# Patient Record
Sex: MALE | Race: WHITE | URBAN - METROPOLITAN AREA
[De-identification: names, ages, dates, MRNs, and addresses within clinical notes are randomized per-mention and may not be internally consistent; named-entity substitution may affect disease eponyms.]

---

## 2023-02-02 ENCOUNTER — APPOINTMENT (OUTPATIENT)
Dept: GENERAL RADIOLOGY | Age: 78
End: 2023-02-02
Payer: MEDICARE

## 2023-02-02 ENCOUNTER — HOSPITAL ENCOUNTER (OUTPATIENT)
Age: 78
Setting detail: OBSERVATION
LOS: 2 days | Discharge: HOME | End: 2023-02-04
Attending: STUDENT IN AN ORGANIZED HEALTH CARE EDUCATION/TRAINING PROGRAM | Admitting: HOSPITALIST
Payer: MEDICARE

## 2023-02-02 DIAGNOSIS — N17.9 AKI (ACUTE KIDNEY INJURY) (HCC): ICD-10-CM

## 2023-02-02 DIAGNOSIS — E87.5 HYPERKALEMIA: Primary | ICD-10-CM

## 2023-02-02 LAB
ABSOLUTE EOS #: 0.2 K/UL (ref 0–0.4)
ABSOLUTE LYMPH #: 0.5 K/UL (ref 1–4.8)
ABSOLUTE MONO #: 0.5 K/UL (ref 0.1–1.2)
ALBUMIN SERPL-MCNC: 4.3 G/DL (ref 3.5–5.2)
ALBUMIN/GLOBULIN RATIO: 1.1 (ref 1–2.5)
ALP SERPL-CCNC: 53 U/L (ref 40–129)
ALT SERPL-CCNC: 11 U/L (ref 5–41)
ANION GAP SERPL CALCULATED.3IONS-SCNC: 11 MMOL/L (ref 9–17)
ANION GAP SERPL CALCULATED.3IONS-SCNC: 9 MMOL/L (ref 9–17)
AST SERPL-CCNC: 18 U/L
BASOPHILS # BLD: 1 % (ref 0–2)
BASOPHILS ABSOLUTE: 0 K/UL (ref 0–0.2)
BILIRUB SERPL-MCNC: 0.2 MG/DL (ref 0.3–1.2)
BUN SERPL-MCNC: 38 MG/DL (ref 8–23)
BUN SERPL-MCNC: 39 MG/DL (ref 8–23)
CALCIUM SERPL-MCNC: 8.4 MG/DL (ref 8.6–10.4)
CALCIUM SERPL-MCNC: 9.1 MG/DL (ref 8.6–10.4)
CHLORIDE SERPL-SCNC: 105 MMOL/L (ref 98–107)
CHLORIDE SERPL-SCNC: 109 MMOL/L (ref 98–107)
CO2 SERPL-SCNC: 21 MMOL/L (ref 20–31)
CO2 SERPL-SCNC: 21 MMOL/L (ref 20–31)
CREAT SERPL-MCNC: 1.73 MG/DL (ref 0.7–1.2)
CREAT SERPL-MCNC: 1.79 MG/DL (ref 0.7–1.2)
EOSINOPHILS RELATIVE PERCENT: 4 % (ref 1–4)
GFR SERPL CREATININE-BSD FRML MDRD: 39 ML/MIN/1.73M2
GFR SERPL CREATININE-BSD FRML MDRD: 40 ML/MIN/1.73M2
GLUCOSE BLD-MCNC: 108 MG/DL (ref 75–110)
GLUCOSE SERPL-MCNC: 106 MG/DL (ref 70–99)
GLUCOSE SERPL-MCNC: 157 MG/DL (ref 70–99)
HCT VFR BLD AUTO: 37.6 % (ref 41–53)
HGB BLD-MCNC: 12.4 G/DL (ref 13.5–17.5)
LYMPHOCYTES # BLD: 10 % (ref 24–44)
MCH RBC QN AUTO: 29.3 PG (ref 26–34)
MCHC RBC AUTO-ENTMCNC: 33.1 G/DL (ref 31–37)
MCV RBC AUTO: 88.7 FL (ref 80–100)
MONOCYTES # BLD: 10 % (ref 2–11)
PDW BLD-RTO: 14.3 % (ref 12.5–15.4)
PLATELET # BLD AUTO: 228 K/UL (ref 140–450)
PMV BLD AUTO: 7.8 FL (ref 6–12)
POTASSIUM SERPL-SCNC: 5.7 MMOL/L (ref 3.7–5.3)
POTASSIUM SERPL-SCNC: 6.4 MMOL/L (ref 3.7–5.3)
PROT SERPL-MCNC: 8.3 G/DL (ref 6.4–8.3)
RBC # BLD: 4.24 M/UL (ref 4.5–5.9)
SEG NEUTROPHILS: 75 % (ref 36–66)
SEGMENTED NEUTROPHILS ABSOLUTE COUNT: 4.1 K/UL (ref 1.8–7.7)
SODIUM SERPL-SCNC: 137 MMOL/L (ref 135–144)
SODIUM SERPL-SCNC: 139 MMOL/L (ref 135–144)
TROPONIN I SERPL DL<=0.01 NG/ML-MCNC: 38 NG/L (ref 0–22)
TROPONIN I SERPL DL<=0.01 NG/ML-MCNC: 45 NG/L (ref 0–22)
WBC # BLD AUTO: 5.4 K/UL (ref 3.5–11)

## 2023-02-02 PROCEDURE — 96375 TX/PRO/DX INJ NEW DRUG ADDON: CPT

## 2023-02-02 PROCEDURE — 2500000003 HC RX 250 WO HCPCS: Performed by: NURSE PRACTITIONER

## 2023-02-02 PROCEDURE — 80053 COMPREHEN METABOLIC PANEL: CPT

## 2023-02-02 PROCEDURE — 36415 COLL VENOUS BLD VENIPUNCTURE: CPT

## 2023-02-02 PROCEDURE — 82947 ASSAY GLUCOSE BLOOD QUANT: CPT

## 2023-02-02 PROCEDURE — 84484 ASSAY OF TROPONIN QUANT: CPT

## 2023-02-02 PROCEDURE — 2580000003 HC RX 258: Performed by: CLINICAL NURSE SPECIALIST

## 2023-02-02 PROCEDURE — 2580000003 HC RX 258: Performed by: NURSE PRACTITIONER

## 2023-02-02 PROCEDURE — 99285 EMERGENCY DEPT VISIT HI MDM: CPT

## 2023-02-02 PROCEDURE — 6360000002 HC RX W HCPCS: Performed by: NURSE PRACTITIONER

## 2023-02-02 PROCEDURE — 93005 ELECTROCARDIOGRAM TRACING: CPT | Performed by: NURSE PRACTITIONER

## 2023-02-02 PROCEDURE — 71045 X-RAY EXAM CHEST 1 VIEW: CPT

## 2023-02-02 PROCEDURE — 6370000000 HC RX 637 (ALT 250 FOR IP): Performed by: NURSE PRACTITIONER

## 2023-02-02 PROCEDURE — 2060000000 HC ICU INTERMEDIATE R&B

## 2023-02-02 PROCEDURE — 80048 BASIC METABOLIC PNL TOTAL CA: CPT

## 2023-02-02 PROCEDURE — 96374 THER/PROPH/DIAG INJ IV PUSH: CPT

## 2023-02-02 PROCEDURE — 96361 HYDRATE IV INFUSION ADD-ON: CPT

## 2023-02-02 PROCEDURE — 85025 COMPLETE CBC W/AUTO DIFF WBC: CPT

## 2023-02-02 RX ORDER — CARVEDILOL 25 MG/1
25 TABLET ORAL 2 TIMES DAILY WITH MEALS
COMMUNITY

## 2023-02-02 RX ORDER — SODIUM CHLORIDE 9 MG/ML
INJECTION, SOLUTION INTRAVENOUS CONTINUOUS
Status: DISCONTINUED | OUTPATIENT
Start: 2023-02-02 | End: 2023-02-03

## 2023-02-02 RX ORDER — TAMSULOSIN HYDROCHLORIDE 0.4 MG/1
0.4 CAPSULE ORAL
COMMUNITY

## 2023-02-02 RX ORDER — OXYBUTYNIN CHLORIDE 5 MG/1
5 TABLET ORAL 2 TIMES DAILY
Status: DISCONTINUED | OUTPATIENT
Start: 2023-02-02 | End: 2023-02-04 | Stop reason: HOSPADM

## 2023-02-02 RX ORDER — HEPARIN SODIUM 5000 [USP'U]/ML
5000 INJECTION, SOLUTION INTRAVENOUS; SUBCUTANEOUS EVERY 8 HOURS SCHEDULED
Status: DISCONTINUED | OUTPATIENT
Start: 2023-02-02 | End: 2023-02-04 | Stop reason: HOSPADM

## 2023-02-02 RX ORDER — SODIUM CHLORIDE 9 MG/ML
INJECTION, SOLUTION INTRAVENOUS PRN
Status: DISCONTINUED | OUTPATIENT
Start: 2023-02-02 | End: 2023-02-04 | Stop reason: HOSPADM

## 2023-02-02 RX ORDER — ASPIRIN 81 MG/1
81 TABLET, CHEWABLE ORAL
COMMUNITY

## 2023-02-02 RX ORDER — ALOGLIPTIN 12.5 MG/1
12.5 TABLET, FILM COATED ORAL DAILY
Status: DISCONTINUED | OUTPATIENT
Start: 2023-02-03 | End: 2023-02-04 | Stop reason: HOSPADM

## 2023-02-02 RX ORDER — SODIUM CHLORIDE 0.9 % (FLUSH) 0.9 %
5-40 SYRINGE (ML) INJECTION PRN
Status: DISCONTINUED | OUTPATIENT
Start: 2023-02-02 | End: 2023-02-04 | Stop reason: HOSPADM

## 2023-02-02 RX ORDER — DEXTROSE MONOHYDRATE 25 G/50ML
25 INJECTION, SOLUTION INTRAVENOUS ONCE
Status: COMPLETED | OUTPATIENT
Start: 2023-02-02 | End: 2023-02-02

## 2023-02-02 RX ORDER — ACETAMINOPHEN 325 MG/1
650 TABLET ORAL EVERY 6 HOURS PRN
Status: DISCONTINUED | OUTPATIENT
Start: 2023-02-02 | End: 2023-02-04 | Stop reason: HOSPADM

## 2023-02-02 RX ORDER — ALOGLIPTIN 12.5 MG/1
12.5 TABLET, FILM COATED ORAL DAILY
COMMUNITY

## 2023-02-02 RX ORDER — ASPIRIN 81 MG/1
81 TABLET, CHEWABLE ORAL DAILY
Status: DISCONTINUED | OUTPATIENT
Start: 2023-02-03 | End: 2023-02-04 | Stop reason: HOSPADM

## 2023-02-02 RX ORDER — OXYBUTYNIN CHLORIDE 5 MG/1
5 TABLET ORAL 2 TIMES DAILY
COMMUNITY

## 2023-02-02 RX ORDER — SODIUM CHLORIDE 0.9 % (FLUSH) 0.9 %
5-40 SYRINGE (ML) INJECTION EVERY 12 HOURS SCHEDULED
Status: DISCONTINUED | OUTPATIENT
Start: 2023-02-02 | End: 2023-02-04 | Stop reason: HOSPADM

## 2023-02-02 RX ORDER — ONDANSETRON 4 MG/1
4 TABLET, ORALLY DISINTEGRATING ORAL EVERY 8 HOURS PRN
Status: DISCONTINUED | OUTPATIENT
Start: 2023-02-02 | End: 2023-02-04 | Stop reason: HOSPADM

## 2023-02-02 RX ORDER — FUROSEMIDE 10 MG/ML
20 INJECTION INTRAMUSCULAR; INTRAVENOUS ONCE
Status: COMPLETED | OUTPATIENT
Start: 2023-02-02 | End: 2023-02-02

## 2023-02-02 RX ORDER — NIFEDIPINE 30 MG/1
90 TABLET, EXTENDED RELEASE ORAL DAILY
Status: DISCONTINUED | OUTPATIENT
Start: 2023-02-03 | End: 2023-02-04 | Stop reason: HOSPADM

## 2023-02-02 RX ORDER — CARVEDILOL 12.5 MG/1
25 TABLET ORAL 2 TIMES DAILY WITH MEALS
Status: DISCONTINUED | OUTPATIENT
Start: 2023-02-03 | End: 2023-02-04 | Stop reason: HOSPADM

## 2023-02-02 RX ORDER — ONDANSETRON 2 MG/ML
4 INJECTION INTRAMUSCULAR; INTRAVENOUS EVERY 6 HOURS PRN
Status: DISCONTINUED | OUTPATIENT
Start: 2023-02-02 | End: 2023-02-04 | Stop reason: HOSPADM

## 2023-02-02 RX ORDER — NIFEDIPINE 90 MG/1
90 TABLET, FILM COATED, EXTENDED RELEASE ORAL DAILY
COMMUNITY

## 2023-02-02 RX ORDER — TAMSULOSIN HYDROCHLORIDE 0.4 MG/1
0.4 CAPSULE ORAL DAILY
Status: DISCONTINUED | OUTPATIENT
Start: 2023-02-03 | End: 2023-02-04 | Stop reason: HOSPADM

## 2023-02-02 RX ORDER — FINASTERIDE 5 MG/1
5 TABLET, FILM COATED ORAL
COMMUNITY

## 2023-02-02 RX ORDER — 0.9 % SODIUM CHLORIDE 0.9 %
1000 INTRAVENOUS SOLUTION INTRAVENOUS ONCE
Status: COMPLETED | OUTPATIENT
Start: 2023-02-02 | End: 2023-02-02

## 2023-02-02 RX ADMIN — FUROSEMIDE 20 MG: 10 INJECTION, SOLUTION INTRAMUSCULAR; INTRAVENOUS at 19:22

## 2023-02-02 RX ADMIN — SODIUM CHLORIDE: 9 INJECTION, SOLUTION INTRAVENOUS at 21:50

## 2023-02-02 RX ADMIN — SODIUM CHLORIDE 1000 ML: 9 INJECTION, SOLUTION INTRAVENOUS at 19:19

## 2023-02-02 RX ADMIN — DEXTROSE MONOHYDRATE 25 G: 25 INJECTION, SOLUTION INTRAVENOUS at 19:19

## 2023-02-02 RX ADMIN — INSULIN HUMAN 5 UNITS: 100 INJECTION, SOLUTION PARENTERAL at 19:23

## 2023-02-02 ASSESSMENT — ENCOUNTER SYMPTOMS
EYES NEGATIVE: 1
GASTROINTESTINAL NEGATIVE: 1
RESPIRATORY NEGATIVE: 1

## 2023-02-02 ASSESSMENT — PAIN - FUNCTIONAL ASSESSMENT: PAIN_FUNCTIONAL_ASSESSMENT: NONE - DENIES PAIN

## 2023-02-02 ASSESSMENT — LIFESTYLE VARIABLES: HOW OFTEN DO YOU HAVE A DRINK CONTAINING ALCOHOL: 2-3 TIMES A WEEK

## 2023-02-02 NOTE — ED PROVIDER NOTES
[address] [facility]  [address] [address] [address]  Phone: [phone]  Fax: [phone]      Attending Physician Attestation    I performed a history and physical examination of the patient and discussed management with the mid level provider. I reviewed the mid level provider's note and agree with the documented findings and plan of care. Any areas of disagreement are noted on the chart. I was personally present for the key portions of any procedures. I have documented in the chart those procedures where I was not present during the key portions. I have reviewed the emergency nurses triage note. I agree with the chief complaint, past medical history, past surgical history, allergies, medications, social and family history as documented unless otherwise noted below. Documentation of the HPI, Physical Exam and Medical Decision Making performed by mid level providers is based on my personal performance of the HPI, PE and MDM. For Physician Assistant/ Nurse Practitioner cases/documentation I have personally evaluated this patient and have completed at least one if not all key elements of the E/M (history, physical exam, and MDM). Additional findings are as noted. INITIAL VITALS:  height is 5' 11\" (1.803 m) and weight is 93.9 kg (207 lb). His oral temperature is 98.2 °F (36.8 °C). His blood pressure is 139/57 (abnormal) and his pulse is 68. His respiration is 16 and oxygen saturation is 94%. DIAGNOSTIC RESULTS       RADIOLOGY:   Non-plain film images such as CT, Ultrasound and MRI are read by the radiologist. Plain radiographic images are visualized and the radiologist interpretations are reviewed as follows:      No orders to display         LABS:  Results for orders placed or performed during the hospital encounter of 02/02/23   CBC with Auto Differential   Result Value Ref Range    WBC 5.4 3.5 - 11.0 k/uL    RBC 4.24 (L) 4.5 - 5.9 m/uL    Hemoglobin 12.4 (L) 13.5 - 17.5 g/dL    Hematocrit 37.6 (L) 41 - 53 %    MCV 88.7 80 - 100 fL    MCH 29.3 26 - 34 pg    MCHC 33.1 31 - 37 g/dL    RDW 14.3 12.5 - 15.4 %    Platelets 383 351 - 864 k/uL    MPV 7.8 6.0 - 12.0 fL    Seg Neutrophils 75 (H) 36 - 66 %    Lymphocytes 10 (L) 24 - 44 %    Monocytes 10 2 - 11 %    Eosinophils % 4 1 - 4 %    Basophils 1 0 - 2 %    Segs Absolute 4.10 1.8 - 7.7 k/uL    Absolute Lymph # 0.50 (L) 1.0 - 4.8 k/uL    Absolute Mono # 0.50 0.1 - 1.2 k/uL    Absolute Eos # 0.20 0.0 - 0.4 k/uL    Basophils Absolute 0.00 0.0 - 0.2 k/uL       EKG: Interpreted by me. Normal sinus rhythm, rate 71, normal axis, mild ST depression in lead I and aVL, no prior EKG available for comparison    EMERGENCY DEPARTMENT COURSE:   Vitals:    Vitals:    02/02/23 1800 02/02/23 1820   BP: (!) 133/49 (!) 139/57   Pulse: 75 68   Resp: 18 16   Temp: 98.2 °F (36.8 °C)    TempSrc: Oral    SpO2: 95% 94%   Weight: 93.9 kg (207 lb)    Height: 5' 11\" (1.803 m)      -------------------------  BP: (!) 139/57, Temp: 98.2 °F (36.8 °C), Heart Rate: 68, Resp: 16      PERTINENT ATTENDING PHYSICIAN COMMENTS:    Outpatient labs done and showed hyperkalemia of 6.3 (as far as he can remember). He has been dealing with hyperkalemia for the last several months. He recently was taken off his lisinopril and had a renal ultrasound done but does not have results of this test.  He denies any symptoms at this time. On exam, patient wake alert and in no acute distress. Vital signs are stable. Abdomen soft, nontender, nondistended. Heart regular rate and rhythm without murmur, lungs clear to auscultation bilaterally. Mild bilateral lower extremity edema. Bilateral radial pulses 2+. Plan for basic labs, EKG. EKG without significant findings. Awaiting labs.       (Please note that portions of this note were completed with a voice recognition program.  Efforts were made to edit the dictations but occasionally words are mis-transcribed.)    Kerry Lindsay,   Emergency Medicine Physician      Kerry Lindsay, DO  02/02/23 [address] [facility]5 Kaiser Hayward, DO  02/02/23 1853

## 2023-02-02 NOTE — ED TRIAGE NOTES
[facility] K+ 6.3 this am.  Pt states had high K+ a couple months ago. Pt denies any kidney issues. EKG done upon rooming. Pt is A/O, non distressful, denies CP or any SOB. Pt is accompanied by son.

## 2023-02-02 NOTE — ED PROVIDER NOTES
[address] [facility]  [address] [facility] [address] [facility] [address]  Phone: [phone]  Fax: [phone]        Pt Name: Alix Griffith  MRN: [id number]  [place] 1945  Date of evaluation: 2/2/23    [address] [place] [address]       Chief Complaint   Patient presents with    Abnormal Lab       HISTORY OF PRESENT ILLNESS (Location/Symptom, Timing/Onset, Context/Setting, Quality, Duration, Modifying Factors, Severity)      Alix Griffith is a 68 y.o. male with no pertinent PMH who presents to the ED via private auto with complaints of an abnormal lab. [facility], he has been dealing with hyperkalemia for the last few months now. He states that he had some blood work done earlier today at the [place], they called him at [facility] and told him that he had an elevated potassium of 6.8 and to go to the emergency department. He denies any symptoms at this time. He denies any chest pain shortness of breath or palpitations. He denies any change in his urinary or bowel habits. Denies any decrease in urinary frequency. He states he has not noticed any blood in his urine or stool. No symptoms at this time    PAST MEDICAL / SURGICAL / SOCIAL / FAMILY HISTORY     PMH:  has a past medical history of CAD (coronary artery disease), Diabetes mellitus ([address] Heart attack ([address] Hypertension, and Leaky heart valve. Surgical History:  has a past surgical history that includes joint replacement (Bilateral); Appendectomy; and eye surgery (Bilateral). Social History:  reports that he has been smoking cigars. He has never used smokeless tobacco. He reports current alcohol use. He reports that he does not use drugs. Family History: has no family status information on file. family history is not on file. Psychiatric History: None    Allergies: Patient has no known allergies. Home Medications:   Prior to Admission medications    Medication Sig Start Date End Date Taking? Authorizing Provider   alogliptin (NESINA) 12.5 MG TABS tablet Take 12.5 mg by mouth daily   Yes Historical Provider, MD   NIFEdipine (ADALAT CC) 90 MG extended release tablet Take 90 mg by mouth daily   Yes Historical Provider, MD   carvedilol (COREG) 25 MG tablet Take 25 mg by mouth 2 times daily (with meals)   Yes Historical Provider, MD   aspirin 81 MG chewable tablet Take 81 mg by mouth    Historical Provider, MD   finasteride (PROSCAR) 5 MG tablet Take 5 mg by mouth    Historical Provider, MD   metFORMIN (GLUCOPHAGE) 1000 MG tablet Take 500 mg by mouth 2 times daily    Historical Provider, MD   oxybutynin (DITROPAN) 5 MG tablet Take 5 mg by mouth 2 times daily    Historical Provider, MD   tamsulosin (FLOMAX) 0.4 MG capsule Take 0.4 mg by mouth    Historical Provider, MD   sertraline (ZOLOFT) 50 MG tablet Take 50 mg by mouth    Historical Provider, MD       REVIEW OF SYSTEMS  (2-9 systems for level 4, 10 ormore for level 5)      Review of Systems   Constitutional: Negative. HENT: Negative. Eyes: Negative. Respiratory: Negative. Cardiovascular: Negative. Gastrointestinal: Negative. Endocrine: Negative. Genitourinary: Negative. Musculoskeletal: Negative. Skin: Negative. Neurological: Negative. Hematological: Negative. Psychiatric/Behavioral: Negative. All other systems negative except as marked. PHYSICAL EXAM  (up to 7 for level 4, 8 or more for level 5)      INITIAL VITALS:  height is 5' 11\" (1.803 m) and weight is 93.9 kg (207 lb). His oral temperature is 98.2 °F (36.8 °C). His blood pressure is 147/54 (abnormal) and his pulse is 70. His respiration is 19 and oxygen saturation is 92%. Vital signs reviewed. Physical Exam  Constitutional:       Appearance: Normal appearance. HENT:      Head: Normocephalic.       Right Ear: External ear normal.      Left Ear: External ear normal.      Nose: Nose normal.      Mouth/Throat:      Mouth: Mucous membranes are moist.      Pharynx: Oropharynx is clear. Eyes:      Extraocular Movements: Extraocular movements intact. Conjunctiva/sclera: Conjunctivae normal.      Pupils: Pupils are equal, round, and reactive to light. Cardiovascular:      Rate and Rhythm: Normal rate and regular rhythm. Pulses: Normal pulses. Heart sounds: Murmur heard. Pulmonary:      Effort: Pulmonary effort is normal.      Breath sounds: Normal breath sounds. Abdominal:      General: Bowel sounds are normal. There is no distension. Palpations: Abdomen is soft. Tenderness: There is no abdominal tenderness. There is no guarding. Musculoskeletal:         General: Normal range of motion. Cervical back: Normal range of motion. Skin:     General: Skin is warm and dry. Capillary Refill: Capillary refill takes less than 2 seconds. Neurological:      Mental Status: He is alert and oriented to person, place, and time. Psychiatric:         Mood and Affect: Mood normal.         Behavior: Behavior normal.         Thought Content: Thought content normal.         Judgment: Judgment normal.         DIFFERENTIAL DIAGNOSIS / MDM     On exam, patient is resting in his room. His son is at bedside. He appears nontoxic no distress is noted. Grade II murmur noted over the 2nd ICS, left side. Lung sounds are clear and equal bilaterally. Bowel sounds are present in all 4 quadrants. No abdominal distention tenderness or guarding is noted. Order some repeat lab work including CBC CMP troponin EKG and reassess. Troponin of 45 potassium of 6.4, creatinine of 1.79 BUN is elevated at 39, CBC showed hemoglobin of 12.4, white blood cell count of 5.4 glucose of 157. Chest x-ray showing no acute process per radiologist read. Insulin dextrose Lasix and fluids are ordered. Blood sugar 108. Patient signed out to the hospitalist team for admission at . Awaiting a callback as of 2038. Repeat troponin is improved at 38.   I spoke with Ml Gibson with the hospitalist team who has agreed to bring the patient in. She is requesting that repeat the potassium while patient is down here, order has been placed. Patient resting comfortably in his room currently awaiting bed assignment. PLAN (LABS / IMAGING / EKG):  Orders Placed This Encounter   Procedures    XR CHEST PORTABLE    CBC with Auto Differential    Comprehensive Metabolic Panel w/ Reflex to MG    Troponin    Troponin    Basic Metabolic Panel    Non-Invasive Hemodynamic Monitoring    POC Glucose Fingerstick    EKG 12 Lead    EKG 12 Lead    ADMIT TO INPATIENT       MEDICATIONS ORDERED:  Orders Placed This Encounter   Medications    furosemide (LASIX) injection 20 mg    insulin regular (HUMULIN R;NOVOLIN R) injection 5 Units    0.9 % sodium chloride bolus    dextrose 50 % IV solution         Controlled Substances Monitoring:     DIAGNOSTIC RESULTS     EKG: All EKG's are interpreted by the Emergency Department Physician who either signs or Co-signs this chart in the absenceof a cardiologist.      RADIOLOGY: All images are read by the radiologist and their interpretations are reviewed. XR CHEST PORTABLE   Final Result   No acute process. Bibasilar hypoaeration             No results found.     LABS:  Results for orders placed or performed during the hospital encounter of 02/02/23   CBC with Auto Differential   Result Value Ref Range    WBC 5.4 3.5 - 11.0 k/uL    RBC 4.24 (L) 4.5 - 5.9 m/uL    Hemoglobin 12.4 (L) 13.5 - 17.5 g/dL    Hematocrit 37.6 (L) 41 - 53 %    MCV 88.7 80 - 100 fL    MCH 29.3 26 - 34 pg    MCHC 33.1 31 - 37 g/dL    RDW 14.3 12.5 - 15.4 %    Platelets 617 187 - 521 k/uL    MPV 7.8 6.0 - 12.0 fL    Seg Neutrophils 75 (H) 36 - 66 %    Lymphocytes 10 (L) 24 - 44 %    Monocytes 10 2 - 11 %    Eosinophils % 4 1 - 4 %    Basophils 1 0 - 2 %    Segs Absolute 4.10 1.8 - 7.7 k/uL    Absolute Lymph # 0.50 (L) 1.0 - 4.8 k/uL    Absolute Mono # 0.50 0.1 - 1.2 k/uL Absolute Eos # 0.20 0.0 - 0.4 k/uL    Basophils Absolute 0.00 0.0 - 0.2 k/uL   Comprehensive Metabolic Panel w/ Reflex to MG   Result Value Ref Range    Glucose 157 (H) 70 - 99 mg/dL    BUN 39 (H) 8 - 23 mg/dL    Creatinine 1.79 (H) 0.70 - 1.20 mg/dL    Est, Glom Filt Rate 39 (L) >60 mL/min/1.73m2    Calcium 9.1 8.6 - 10.4 mg/dL    Sodium 137 135 - 144 mmol/L    Potassium 6.4 (HH) 3.7 - 5.3 mmol/L    Chloride 105 98 - 107 mmol/L    CO2 21 20 - 31 mmol/L    Anion Gap 11 9 - 17 mmol/L    Alkaline Phosphatase 53 40 - 129 U/L    ALT 11 5 - 41 U/L    AST 18 <40 U/L    Total Bilirubin 0.2 (L) 0.3 - 1.2 mg/dL    Total Protein 8.3 6.4 - 8.3 g/dL    Albumin 4.3 3.5 - 5.2 g/dL    Albumin/Globulin Ratio 1.1 1.0 - 2.5   Troponin   Result Value Ref Range    Troponin, High Sensitivity 45 (H) 0 - 22 ng/L   Troponin   Result Value Ref Range    Troponin, High Sensitivity 38 (H) 0 - 22 ng/L   POC Glucose Fingerstick   Result Value Ref Range    POC Glucose 108 75 - 110 mg/dL       EMERGENCY DEPARTMENT COURSE           Vitals:    Vitals:    02/02/23 1900 02/02/23 2001 02/02/23 2016 02/02/23 2031   BP: (!) 143/54 (!) 148/55 (!) 155/59 (!) 147/54   Pulse: 69 73 81 70   Resp: 18 20 21 19   Temp:       TempSrc:       SpO2: 91% 92% (!) 88% 92%   Weight:       Height:         -------------------------  BP: (!) 147/54, Temp: 98.2 °F (36.8 °C), Heart Rate: 70, Resp: 19      RE-EVALUATION:  See ED Course notes above. CONSULTS:  None    PROCEDURES:  None    FINAL IMPRESSION      1. Hyperkalemia    2. LANEY (acute kidney injury) ([facility]          DISPOSITION / PLAN     PATIENT REFERRED TO:  No follow-up provider specified.     DISCHARGE MEDICATIONS:  New Prescriptions    No medications on file       Merl Course, APRN - NP   Emergency Medicine Nurse Practitioner    (Please note that portions of this note were completed with a voice recognition program.  Efforts were made to edit the dictations but occasionally words aremis-transcribed.) dictations but occasionally words aremis-transcribed.)      FELICITA Singh NP  02/06/23 0809       FELICITA Singh NP  02/06/23 1486

## 2023-02-03 LAB
ALBUMIN SERPL-MCNC: 3.5 G/DL (ref 3.5–5.2)
ALBUMIN/GLOBULIN RATIO: 1.1 (ref 1–2.5)
ALP SERPL-CCNC: 44 U/L (ref 40–129)
ALT SERPL-CCNC: 7 U/L (ref 5–41)
ANION GAP SERPL CALCULATED.3IONS-SCNC: 9 MMOL/L (ref 9–17)
ANION GAP SERPL CALCULATED.3IONS-SCNC: 9 MMOL/L (ref 9–17)
AST SERPL-CCNC: 14 U/L
BACTERIA: ABNORMAL
BILIRUB SERPL-MCNC: 0.2 MG/DL (ref 0.3–1.2)
BILIRUBIN URINE: NEGATIVE
BUN SERPL-MCNC: 32 MG/DL (ref 8–23)
BUN SERPL-MCNC: 35 MG/DL (ref 8–23)
CALCIUM SERPL-MCNC: 7.9 MG/DL (ref 8.6–10.4)
CALCIUM SERPL-MCNC: 8.3 MG/DL (ref 8.6–10.4)
CHLORIDE SERPL-SCNC: 105 MMOL/L (ref 98–107)
CHLORIDE SERPL-SCNC: 107 MMOL/L (ref 98–107)
CO2 SERPL-SCNC: 20 MMOL/L (ref 20–31)
CO2 SERPL-SCNC: 20 MMOL/L (ref 20–31)
COLOR: YELLOW
CREAT SERPL-MCNC: 1.48 MG/DL (ref 0.7–1.2)
CREAT SERPL-MCNC: 1.57 MG/DL (ref 0.7–1.2)
EKG ATRIAL RATE: 68 BPM
EKG ATRIAL RATE: 71 BPM
EKG P AXIS: 38 DEGREES
EKG P AXIS: 38 DEGREES
EKG P-R INTERVAL: 188 MS
EKG P-R INTERVAL: 194 MS
EKG Q-T INTERVAL: 408 MS
EKG Q-T INTERVAL: 426 MS
EKG QRS DURATION: 116 MS
EKG QRS DURATION: 116 MS
EKG QTC CALCULATION (BAZETT): 443 MS
EKG QTC CALCULATION (BAZETT): 452 MS
EKG R AXIS: -27 DEGREES
EKG R AXIS: -29 DEGREES
EKG T AXIS: 112 DEGREES
EKG T AXIS: 93 DEGREES
EKG VENTRICULAR RATE: 68 BPM
EKG VENTRICULAR RATE: 71 BPM
EPITHELIAL CELLS UA: ABNORMAL /HPF (ref 0–5)
GFR SERPL CREATININE-BSD FRML MDRD: 45 ML/MIN/1.73M2
GFR SERPL CREATININE-BSD FRML MDRD: 48 ML/MIN/1.73M2
GLUCOSE SERPL-MCNC: 100 MG/DL (ref 70–99)
GLUCOSE SERPL-MCNC: 96 MG/DL (ref 70–99)
GLUCOSE UR STRIP.AUTO-MCNC: NEGATIVE MG/DL
INR PPP: 1
KETONES UR STRIP.AUTO-MCNC: NEGATIVE MG/DL
LEUKOCYTE ESTERASE UR QL STRIP.AUTO: NEGATIVE
MAGNESIUM SERPL-MCNC: 1.9 MG/DL (ref 1.6–2.6)
NITRITE UR QL STRIP.AUTO: NEGATIVE
POTASSIUM SERPL-SCNC: 5.1 MMOL/L (ref 3.7–5.3)
POTASSIUM SERPL-SCNC: 5.2 MMOL/L (ref 3.7–5.3)
PROT SERPL-MCNC: 6.8 G/DL (ref 6.4–8.3)
PROT UR STRIP.AUTO-MCNC: 5 MG/DL (ref 5–8)
PROT UR STRIP.AUTO-MCNC: ABNORMAL MG/DL
PROTHROMBIN TIME: 10.7 SEC (ref 9.4–12.6)
RBC CLUMPS #/AREA URNS AUTO: ABNORMAL /HPF (ref 0–2)
SODIUM SERPL-SCNC: 134 MMOL/L (ref 135–144)
SODIUM SERPL-SCNC: 136 MMOL/L (ref 135–144)
SODIUM,UR: 127 MMOL/L
SPECIFIC GRAVITY UA: 1.02 (ref 1–1.03)
TOTAL PROTEIN, URINE: 53 MG/DL
TSH SERPL-ACNC: 4.24 UIU/ML (ref 0.3–5)
TURBIDITY: CLEAR
URINE HGB: ABNORMAL
UROBILINOGEN, URINE: NORMAL
WBC UA: ABNORMAL /HPF (ref 0–5)

## 2023-02-03 PROCEDURE — 84156 ASSAY OF PROTEIN URINE: CPT

## 2023-02-03 PROCEDURE — 96361 HYDRATE IV INFUSION ADD-ON: CPT

## 2023-02-03 PROCEDURE — 83735 ASSAY OF MAGNESIUM: CPT

## 2023-02-03 PROCEDURE — 96372 THER/PROPH/DIAG INJ SC/IM: CPT

## 2023-02-03 PROCEDURE — 80053 COMPREHEN METABOLIC PANEL: CPT

## 2023-02-03 PROCEDURE — 2060000000 HC ICU INTERMEDIATE R&B

## 2023-02-03 PROCEDURE — 84300 ASSAY OF URINE SODIUM: CPT

## 2023-02-03 PROCEDURE — 82533 TOTAL CORTISOL: CPT

## 2023-02-03 PROCEDURE — 6370000000 HC RX 637 (ALT 250 FOR IP): Performed by: HOSPITALIST

## 2023-02-03 PROCEDURE — 6370000000 HC RX 637 (ALT 250 FOR IP): Performed by: CLINICAL NURSE SPECIALIST

## 2023-02-03 PROCEDURE — 99222 1ST HOSP IP/OBS MODERATE 55: CPT | Performed by: HOSPITALIST

## 2023-02-03 PROCEDURE — 94760 N-INVAS EAR/PLS OXIMETRY 1: CPT

## 2023-02-03 PROCEDURE — 6360000002 HC RX W HCPCS: Performed by: CLINICAL NURSE SPECIALIST

## 2023-02-03 PROCEDURE — 81001 URINALYSIS AUTO W/SCOPE: CPT

## 2023-02-03 PROCEDURE — 36415 COLL VENOUS BLD VENIPUNCTURE: CPT

## 2023-02-03 PROCEDURE — 84443 ASSAY THYROID STIM HORMONE: CPT

## 2023-02-03 PROCEDURE — 85610 PROTHROMBIN TIME: CPT

## 2023-02-03 PROCEDURE — 2580000003 HC RX 258: Performed by: HOSPITALIST

## 2023-02-03 PROCEDURE — 80048 BASIC METABOLIC PNL TOTAL CA: CPT

## 2023-02-03 RX ORDER — SODIUM CHLORIDE 9 MG/ML
INJECTION, SOLUTION INTRAVENOUS CONTINUOUS
Status: DISCONTINUED | OUTPATIENT
Start: 2023-02-03 | End: 2023-02-04 | Stop reason: HOSPADM

## 2023-02-03 RX ORDER — HYDRALAZINE HYDROCHLORIDE 25 MG/1
25 TABLET, FILM COATED ORAL EVERY 8 HOURS SCHEDULED
Status: DISCONTINUED | OUTPATIENT
Start: 2023-02-03 | End: 2023-02-04 | Stop reason: HOSPADM

## 2023-02-03 RX ADMIN — SODIUM CHLORIDE: 9 INJECTION, SOLUTION INTRAVENOUS at 12:54

## 2023-02-03 RX ADMIN — CARVEDILOL 25 MG: 12.5 TABLET, FILM COATED ORAL at 09:07

## 2023-02-03 RX ADMIN — SODIUM ZIRCONIUM CYCLOSILICATE 10 G: 10 POWDER, FOR SUSPENSION ORAL at 21:28

## 2023-02-03 RX ADMIN — OXYBUTYNIN CHLORIDE 5 MG: 5 TABLET ORAL at 09:06

## 2023-02-03 RX ADMIN — NIFEDIPINE 90 MG: 30 TABLET, FILM COATED, EXTENDED RELEASE ORAL at 09:06

## 2023-02-03 RX ADMIN — HYDRALAZINE HYDROCHLORIDE 25 MG: 25 TABLET, FILM COATED ORAL at 21:28

## 2023-02-03 RX ADMIN — SERTRALINE HYDROCHLORIDE 50 MG: 50 TABLET ORAL at 09:07

## 2023-02-03 RX ADMIN — HEPARIN SODIUM 5000 UNITS: 5000 INJECTION INTRAVENOUS; SUBCUTANEOUS at 21:28

## 2023-02-03 RX ADMIN — CARVEDILOL 25 MG: 12.5 TABLET, FILM COATED ORAL at 18:40

## 2023-02-03 RX ADMIN — ASPIRIN 81 MG CHEWABLE TABLET 81 MG: 81 TABLET CHEWABLE at 09:07

## 2023-02-03 RX ADMIN — SODIUM ZIRCONIUM CYCLOSILICATE 5 G: 5 POWDER, FOR SUSPENSION ORAL at 11:32

## 2023-02-03 RX ADMIN — TAMSULOSIN HYDROCHLORIDE 0.4 MG: 0.4 CAPSULE ORAL at 09:07

## 2023-02-03 RX ADMIN — ALOGLIPTIN 12.5 MG: 12.5 TABLET, FILM COATED ORAL at 09:06

## 2023-02-03 RX ADMIN — HEPARIN SODIUM 5000 UNITS: 5000 INJECTION INTRAVENOUS; SUBCUTANEOUS at 14:44

## 2023-02-03 RX ADMIN — SODIUM CHLORIDE: 9 INJECTION, SOLUTION INTRAVENOUS at 23:16

## 2023-02-03 RX ADMIN — OXYBUTYNIN CHLORIDE 5 MG: 5 TABLET ORAL at 21:28

## 2023-02-03 RX ADMIN — HYDRALAZINE HYDROCHLORIDE 25 MG: 25 TABLET, FILM COATED ORAL at 14:44

## 2023-02-03 NOTE — CARE COORDINATION
Case Management Assessment  Initial Evaluation    Date/Time of Evaluation: 2/3/2023 9:04 AM  Assessment Completed by: Corrinne Skeans, RN    If patient is discharged prior to next notation, then this note serves as note for discharge by case management. Patient Name: Rosanen Sahu                   YOB: 1945  Diagnosis: Hyperkalemia [E87.5]  LANEY (acute kidney injury) [facility]) [N17.9]                   Date / Time: 2/2/2023  5:40 PM    Patient Admission Status: Inpatient   Readmission Risk (Low < 19, Mod (19-27), High > 27): Readmission Risk Score: 9.5    Current PCP: None Provider  PCP verified by CM? Chart Reviewed: Yes      History Provided by: Patient  Patient Orientation: Alert and Oriented    Patient Cognition: Alert    Hospitalization in the last 30 days (Readmission):  No    If yes, Readmission Assessment in CM Navigator will be completed. Advance Directives:      Code Status: Full Code   Patient's Primary Decision Maker is:        Discharge Planning:    Patient lives with: Alone Type of Home: House  Primary Care Giver: Self  Patient Support Systems include: Children   Current Financial resources:    Current community resources:    Current services prior to admission: None            Current DME:              Type of Home Care services:  None    ADLS  Prior functional level: Independent in ADLs/IADLs  Current functional level: Independent in ADLs/IADLs    PT AM-PAC:   /24  OT AM-PAC:   /24    Family can provide assistance at DC: Yes  Would you like Case Management to discuss the discharge plan with any other family members/significant others, and if so, who?     Plans to Return to Present Housing: Yes  Other Identified Issues/Barriers to RETURNING to current housing:   Potential Assistance needed at discharge: N/A            Potential DME:    Patient expects to discharge to: [address] [place] [address] for transportation at discharge: Family    Financial    Payor: Gely Craig / Plan: Patricia Torres CHOICE-PPO MEDICARE / Product Type: *No Product type* /     Does insurance require precert for SNF: Yes    Potential assistance Purchasing Medications:    Meds-to-Beds request:        [facility] ALVARES [address], [address] [phone] Ankita Escobedo [phone]  [place] [address] [address]  Phone: [phone] Fax: [phone]      Notes:    Factors facilitating achievement of predicted outcomes:     Barriers to discharge: Additional Case Management Notes: d/c home independent, son lives across the street    The Plan for Transition of Care is related to the following treatment goals of Hyperkalemia [E87.5]  LANEY (acute kidney injury) ([facility] [address] [G74.9]    IF APPLICABLE: The Patient and/or patient representative Ángel Washburn and his family were provided with a choice of provider and agrees with the discharge plan. Freedom of choice list with basic dialogue that supports the patient's individualized plan of care/goals and shares the quality data associated with the providers was provided to: Patient   Patient Representative Name:       The Patient and/or Patient Representative Agree with the Discharge Plan?  Yes    Kaz Navarro RN  Case Management Department  Ph: [phone] Fax: [phone]

## 2023-02-03 NOTE — H&P
[facility]  Office: [address], DO, Aliza Annalee, DO, Heidi Soheila, DO, Prema Alfonsocam Blood, DO, Samia Bermeo MD, Johann Riedel, MD, Jose Clarke MD, Lj Ortiz MD,  James Vital MD, Cecelia Maya MD, James Palumbo, DO, Andrea Clements MD,  Jesús Gallagher MD, Clarence Garcia MD, Edil Nunez, DO, Ankur Harrell MD, Homero Jones MD, Stefan Craig DO, Bharath Araiza MD, Aissatou Ruff MD, Ramona Fry MD, Kyung Heller MD, Michelle Sweeney DO, Radha Ya MD, Ailyn Krishnamurthy MD, Marilee Peralta, [facility],  Carrie Juarez, CNP, Raya Valles, CNP, Preston Cabral, CNP,  Doris Garland, [facility], Ana Jha, CNP, Wilbur Leger, CNP, Jenny Tierney, CNP, Edis Nava, CNP, Gabriel Ivan, CNP, Mayito Guillaume PA-C, Mallory King, CNS, Wandy Carlos, CNP, Alli Billings, [address]    HISTORY AND PHYSICAL EXAMINATION            Date:   2/3/2023  Patient name:  Magaly Butler  Date of admission:  2/2/2023  5:40 PM  MRN:   [id number]  Account:  [de-identified]  YOB: 1945  PCP:    None Provider  Room:   [address] [place] [address]  Code Status:    Full Code    Chief Complaint:     Chief Complaint   Patient presents with    Abnormal Lab     This very pleasant 51-year-old male presents the hospital due to abnormal outpatient laboratory data, patient states \"my potassium is high\"    History Obtained From:     patient, electronic medical record    History of Present Illness:     Magaly Butler is a 68 y.o. Non- / non  male who presents with Abnormal Lab   and is admitted to the hospital for the management of Hyperkalemia. This very pleasant 51-year-old male presents to the hospital due to outpatient laboratory evaluation demonstrating hyperkalemia. The patient has been undergoing work-up as an outpatient through the [place] system regarding his hyperkalemia.   He has undergone kidney/bladder ultrasound and is to follow-up with his physician on the eighth of this month. Outpatient laboratory data demonstrated hyperkalemia which prompted his evaluation in the emergency room. He has been given IV fluids, Lasix, dextrose, insulin. His most recent potassium is 5.1 and I have started him on Lokelma. As he is undergoing extensive work-up at this point in time I have canceled the renal ultrasound as one has already been completed and he is following up with his primary care on Wednesday for results. The patient would prefer to follow-up with his primary care physician rather than reinitiate work-up while in the hospital.  I am going to check repeat labs this afternoon and providing he shows improvement I suspect he can be discharged home with [facility] to be taken 3 times weekly until he follows up with his primary care doctor. He does have some acute kidney injury which is improved with IV fluids. I would recommend continue to hold metformin given his age and his renal function. Providing repeat laboratory data shows continued improvement anticipate he will be discharged home on [facility] with outpatient follow-up. Past Medical History:     Past Medical History:   Diagnosis Date    CAD (coronary artery disease)     Diabetes mellitus ([facility]     Heart attack ([facility] [address]     pt states he was told he has had a \"mild\" one in the past.    Hypertension     Leaky heart valve         Past Surgical History:     Past Surgical History:   Procedure Laterality Date    APPENDECTOMY      EYE SURGERY Bilateral     cataracts    JOINT REPLACEMENT Bilateral     hips        Medications Prior to Admission:     Prior to Admission medications    Medication Sig Start Date End Date Taking?  Authorizing Provider   alogliptin (NESINA) 12.5 MG TABS tablet Take 12.5 mg by mouth daily   Yes Historical Provider, MD   NIFEdipine (ADALAT CC) 90 MG extended release tablet Take 90 mg by mouth daily   Yes Historical Provider, MD   carvedilol (COREG) 25 MG tablet Take 25 mg by mouth 2 times daily (with meals)   Yes Historical Provider, MD   aspirin 81 MG chewable tablet Take 81 mg by mouth    Historical Provider, MD   finasteride (PROSCAR) 5 MG tablet Take 5 mg by mouth    Historical Provider, MD   metFORMIN (GLUCOPHAGE) 1000 MG tablet Take 500 mg by mouth 2 times daily    Historical Provider, MD   oxybutynin (DITROPAN) 5 MG tablet Take 5 mg by mouth 2 times daily    Historical Provider, MD   tamsulosin (FLOMAX) 0.4 MG capsule Take 0.4 mg by mouth    Historical Provider, MD   sertraline (ZOLOFT) 50 MG tablet Take 50 mg by mouth    Historical Provider, MD        Allergies:     Patient has no known allergies. Social History:     Tobacco:    reports that he has been smoking cigars. He has never used smokeless tobacco.  Alcohol:      reports current alcohol use. Drug Use:  reports no history of drug use. Family History:     History reviewed. No pertinent family history. Review of Systems:     Positive and Negative as described in HPI.     CONSTITUTIONAL:  negative for fevers, chills, sweats, fatigue, weight loss  HEENT:  negative for vision, hearing changes, runny nose, throat pain  RESPIRATORY:  negative for shortness of breath, cough, congestion, wheezing  CARDIOVASCULAR:  negative for chest pain, palpitations  GASTROINTESTINAL:  negative for nausea, vomiting, diarrhea, constipation, change in bowel habits, abdominal pain   GENITOURINARY:  negative for difficulty of urination, burning with urination, frequency   INTEGUMENT:  negative for rash, skin lesions, easy bruising   HEMATOLOGIC/LYMPHATIC:  negative for swelling/edema   ALLERGIC/IMMUNOLOGIC:  negative for urticaria , itching  ENDOCRINE:  negative increase in drinking, increase in urination, hot or cold intolerance  MUSCULOSKELETAL:  negative joint pains, muscle aches, swelling of joints  NEUROLOGICAL:  negative for headaches, dizziness, lightheadedness, numbness, pain, tingling extremities  BEHAVIOR/PSYCH:  negative for depression, anxiety    Physical Exam:   BP (!) 175/52 Comment: MD notified.  no prn ordered  Pulse 66   Temp 98.8 °F (37.1 °C) (Oral)   Resp 17   Ht 5' 11\" (1.803 m)   Wt 207 lb (93.9 kg)   SpO2 90%   BMI 28.87 kg/m²   Temp (24hrs), Av.3 °F (36.8 °C), Min:97.9 °F (36.6 °C), Max:98.8 °F (37.1 °C)    Recent Labs     23   POCGLU 108       Intake/Output Summary (Last 24 hours) at 2/3/2023 1229  Last data filed at 2/3/2023 1134  Gross per 24 hour   Intake 1000 ml   Output 1900 ml   Net -900 ml       General Appearance:  alert, well appearing, and in no acute distress  Mental status: oriented to person, place, and time  Head:  normocephalic, atraumatic  Eye: no icterus, redness, pupils equal and reactive, extraocular eye movements intact, conjunctiva clear  Ear: normal external ear, no discharge, hearing intact  Nose:  no drainage noted  Mouth: mucous membranes moist  Neck: supple, no carotid bruits, thyroid not palpable  Lungs: Bilateral equal air entry, clear to ausculation, no wheezing, rales or rhonchi, normal effort  Cardiovascular: normal rate, regular rhythm, no murmur, gallop, rub  Abdomen: Soft, nontender, nondistended, normal bowel sounds, no hepatomegaly or splenomegaly  Neurologic: There are no new focal motor or sensory deficits, normal muscle tone and bulk, no abnormal sensation, normal speech, cranial nerves II through XII grossly intact  Skin: No gross lesions, rashes, bruising or bleeding on exposed skin area  Extremities:  peripheral pulses palpable, no pedal edema or calf pain with palpation  Psych: normal affect     Investigations:      Laboratory Testing:  Recent Results (from the past 24 hour(s))   EKG 12 Lead    Collection Time: 23  5:51 PM   Result Value Ref Range    Ventricular Rate 71 BPM    Atrial Rate 71 BPM    P-R Interval 194 ms    QRS Duration 116 ms    Q-T Interval 408 ms    QTc Calculation (Bazett) 443 ms    P Axis 38 degrees    R Axis -27 degrees    T Axis 112 degrees   CBC with Auto Differential    Collection Time: 02/02/23  6:02 PM   Result Value Ref Range    WBC 5.4 3.5 - 11.0 k/uL    RBC 4.24 (L) 4.5 - 5.9 m/uL    Hemoglobin 12.4 (L) 13.5 - 17.5 g/dL    Hematocrit 37.6 (L) 41 - 53 %    MCV 88.7 80 - 100 fL    MCH 29.3 26 - 34 pg    MCHC 33.1 31 - 37 g/dL    RDW 14.3 12.5 - 15.4 %    Platelets 926 029 - 698 k/uL    MPV 7.8 6.0 - 12.0 fL    Seg Neutrophils 75 (H) 36 - 66 %    Lymphocytes 10 (L) 24 - 44 %    Monocytes 10 2 - 11 %    Eosinophils % 4 1 - 4 %    Basophils 1 0 - 2 %    Segs Absolute 4.10 1.8 - 7.7 k/uL    Absolute Lymph # 0.50 (L) 1.0 - 4.8 k/uL    Absolute Mono # 0.50 0.1 - 1.2 k/uL    Absolute Eos # 0.20 0.0 - 0.4 k/uL    Basophils Absolute 0.00 0.0 - 0.2 k/uL   Comprehensive Metabolic Panel w/ Reflex to MG    Collection Time: 02/02/23  6:02 PM   Result Value Ref Range    Glucose 157 (H) 70 - 99 mg/dL    BUN 39 (H) 8 - 23 mg/dL    Creatinine 1.79 (H) 0.70 - 1.20 mg/dL    Est, Glom Filt Rate 39 (L) >60 mL/min/1.73m2    Calcium 9.1 8.6 - 10.4 mg/dL    Sodium 137 135 - 144 mmol/L    Potassium 6.4 (HH) 3.7 - 5.3 mmol/L    Chloride 105 98 - 107 mmol/L    CO2 21 20 - 31 mmol/L    Anion Gap 11 9 - 17 mmol/L    Alkaline Phosphatase 53 40 - 129 U/L    ALT 11 5 - 41 U/L    AST 18 <40 U/L    Total Bilirubin 0.2 (L) 0.3 - 1.2 mg/dL    Total Protein 8.3 6.4 - 8.3 g/dL    Albumin 4.3 3.5 - 5.2 g/dL    Albumin/Globulin Ratio 1.1 1.0 - 2.5   Troponin    Collection Time: 02/02/23  6:02 PM   Result Value Ref Range    Troponin, High Sensitivity 45 (H) 0 - 22 ng/L   Troponin    Collection Time: 02/02/23  7:56 PM   Result Value Ref Range    Troponin, High Sensitivity 38 (H) 0 - 22 ng/L   POC Glucose Fingerstick    Collection Time: 02/02/23  7:58 PM   Result Value Ref Range    POC Glucose 108 75 - 110 mg/dL   EKG 12 Lead    Collection Time: 02/02/23  8:03 PM   Result Value Ref Range    Ventricular Rate 68 BPM    Atrial Rate 68 BPM P-R Interval 188 ms    QRS Duration 116 ms    Q-T Interval 426 ms    QTc Calculation (Bazett) 452 ms    P Axis 38 degrees    R Axis -29 degrees    T Axis 93 degrees   Basic Metabolic Panel    Collection Time: 02/02/23  9:06 PM   Result Value Ref Range    Glucose 106 (H) 70 - 99 mg/dL    BUN 38 (H) 8 - 23 mg/dL    Creatinine 1.73 (H) 0.70 - 1.20 mg/dL    Est, Glom Filt Rate 40 (L) >60 mL/min/1.73m2    Calcium 8.4 (L) 8.6 - 10.4 mg/dL    Sodium 139 135 - 144 mmol/L    Potassium 5.7 (H) 3.7 - 5.3 mmol/L    Chloride 109 (H) 98 - 107 mmol/L    CO2 21 20 - 31 mmol/L    Anion Gap 9 9 - 17 mmol/L   Urinalysis with microscopic    Collection Time: 02/03/23 12:11 AM   Result Value Ref Range    Color, UA Yellow Yellow    Turbidity UA Clear Clear    Glucose, Ur NEGATIVE NEGATIVE    Bilirubin Urine NEGATIVE NEGATIVE    Ketones, Urine NEGATIVE NEGATIVE    Specific Gravity, UA 1.020 1.005 - 1.030    Urine Hgb TRACE (A) NEGATIVE    pH, UA 5.0 5.0 - 8.0    Protein, UA 1+ (A) NEGATIVE    Urobilinogen, Urine Normal Normal    Nitrite, Urine NEGATIVE NEGATIVE    Leukocyte Esterase, Urine NEGATIVE NEGATIVE    WBC, UA 0 TO 2 0 - 5 /HPF    RBC, UA 0 TO 2 0 - 2 /HPF    Epithelial Cells UA 0 TO 2 0 - 5 /HPF    Bacteria, UA FEW (A) None   Sodium, urine, random    Collection Time: 02/03/23 12:11 AM   Result Value Ref Range    Sodium,Ur 127 mmol/L   Comprehensive Metabolic Panel w/ Reflex to MG    Collection Time: 02/03/23  4:58 AM   Result Value Ref Range    Glucose 96 70 - 99 mg/dL    BUN 35 (H) 8 - 23 mg/dL    Creatinine 1.57 (H) 0.70 - 1.20 mg/dL    Est, Glom Filt Rate 45 (L) >60 mL/min/1.73m2    Calcium 8.3 (L) 8.6 - 10.4 mg/dL    Sodium 136 135 - 144 mmol/L    Potassium 5.1 3.7 - 5.3 mmol/L    Chloride 107 98 - 107 mmol/L    CO2 20 20 - 31 mmol/L    Anion Gap 9 9 - 17 mmol/L    Alkaline Phosphatase 44 40 - 129 U/L    ALT 7 5 - 41 U/L    AST 14 <40 U/L    Total Bilirubin 0.2 (L) 0.3 - 1.2 mg/dL    Total Protein 6.8 6.4 - 8.3 g/dL    Albumin 3.5 3.5 - 5.2 g/dL    Albumin/Globulin Ratio 1.1 1.0 - 2.5   Magnesium    Collection Time: 02/03/23  4:58 AM   Result Value Ref Range    Magnesium 1.9 1.6 - 2.6 mg/dL   Protime-INR    Collection Time: 02/03/23  4:58 AM   Result Value Ref Range    Protime 10.7 9.4 - 12.6 sec    INR 1.0        Imaging/Diagnostics:  XR CHEST PORTABLE    Result Date: 2/2/2023  No acute process. Bibasilar hypoaeration       Assessment :      Hospital Problems             Last Modified POA    * (Principal) Hyperkalemia 2/2/2023 Yes       Plan:     Patient status observation in the Med/Surge    Hyperkalemia  Initiate Lokelma 5 mg 3 times daily  Check repeat labs at 1400 hrs. Providing potassium improved anticipate patient to be discharged home on Yvone Diaz to be taken 3 times weekly pending further work-up regarding his persistent hyperkalemia  Acute kidney injury  Resolving, continue IV fluids  Check labs at 1400 hrs.   Would recommend discontinuation of metformin for now  Essential hypertension  Add hydralazine  Avoid ACE inhibitor/ARB due to hyperkalemia  Continue Procardia and Coreg  Diabetes mellitus  Continue Nesina    Await repeat labs, anticipate discharge home later today providing potassium normalizes    Consultations:   None    Patient is admitted as observation status    Eric Villanueva DO  2/3/2023  12:29 PM    Copy sent to Dr. Charlotte Saunders Provider

## 2023-02-03 NOTE — PROGRESS NOTES
SPIRITUAL CARE PROGRESS NOTE    Spiritual Assessment: Intervention: Outcome:       Patient sitting up in bed eating lunch, in apparent positive mood. Patient reports he is going home after an overnight with blood chemistry issues. Patient has moved from [place] across [place] from his son, who is good support  When offered patient was enthusiastic about prayer. Prayer was given and appreciaiton expressed. 02/03/23 1201   Encounter Summary   Encounter Overview/Reason  Initial Encounter   Service Provided For: Patient   [facility]   Last Encounter  02/03/23   Complexity of Encounter Low   Begin Time 1155   End Time  1201   Total Time Calculated 6 min   Encounter    Type Initial Screen/Assessment   Spiritual/Emotional needs   Type Spiritual Support   Assessment/Intervention/Outcome   Assessment Calm;Coping; Hopeful   Intervention Active listening;Empowerment;Nurtured Hope;Prayer (assurance of)/[place]   Outcome Acceptance; Coping;Encouraged; Optimistic

## 2023-02-03 NOTE — PROGRESS NOTES
[facility]  Office: [address], DO, Mayuri Lyle, DO, Osbaldoeduardo Abhijeetshikha, DO, Nisa Marie Blood, DO, Alejo Rodgers MD, Philomena Steven MD, Yasmine Mao MD, An Dobbs MD,  Anamaria Alvarado MD, Morenita Carrera MD, Ruth Rodriguez, DO, Julito Martin MD,  Ml Orta MD, Ilir Grijalva MD, Maria Elena Scanlon, DO, Ryne Farfan MD, Asad Kahn MD, Varinder Rodriguez, DO, Giuliana Hardy MD, Oli Mendes MD, Akilah Jose MD, Chrissy Salguero MD, Grace Armstrong DO, Leigh Maher MD, Elvia Burger MD, Russ Banks, CNP,  Aguilar Oconnell, CNP, Akin Power, CNP, Hailey Mccurdy, CNP,  Patric Berumen, [facility], Amina Tipton, CNP, Letty Arellano, CNP, Clarita Child, CNP, Nia Jose, CNP, Ankit Alex, CNP, Rodrigue Valencia PAKhadijahC, Jamel Ramirez, CNS, Miguel A Sherman, CNP, [address]    Second Visit Note  For more detailed information please refer to the progress note of the day      2/3/2023    3:33 PM    Name:   Rosanne Sahu  MRN:     [id number]     [place]:      [de-identified]   Room:   [facility]/[address] [place] [address] Day:  1  Admit Date:  2/2/2023  5:40 PM    PCP:   None Provider  Code Status:  Full Code        Pt vitals were reviewed   New labs were reviewed   Patient was seen    Updated plan :     Persistent hyperkalemia  Patient continues to have significant hyperkalemia. 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 I am going to expand work-up and check thyroid studies, cortisol, renin and aldosterone levels. We will continue with fluid resuscitation and Giancarlo Johnson. At this point in time patient qualifies for inpatient admission due to his refractory hyperkalemia.         Akil Rodriguez DO  2/3/2023  3:33 PM

## 2023-02-04 VITALS
HEIGHT: 71 IN | BODY MASS INDEX: 30.06 KG/M2 | RESPIRATION RATE: 18 BRPM | TEMPERATURE: 97.3 F | SYSTOLIC BLOOD PRESSURE: 173 MMHG | OXYGEN SATURATION: 92 % | WEIGHT: 214.73 LBS | HEART RATE: 64 BPM | DIASTOLIC BLOOD PRESSURE: 56 MMHG

## 2023-02-04 LAB
ANION GAP SERPL CALCULATED.3IONS-SCNC: 10 MMOL/L (ref 9–17)
ANION GAP SERPL CALCULATED.3IONS-SCNC: 10 MMOL/L (ref 9–17)
BUN SERPL-MCNC: 30 MG/DL (ref 8–23)
BUN SERPL-MCNC: 31 MG/DL (ref 8–23)
CALCIUM SERPL-MCNC: 8.3 MG/DL (ref 8.6–10.4)
CALCIUM SERPL-MCNC: 8.3 MG/DL (ref 8.6–10.4)
CHLORIDE SERPL-SCNC: 104 MMOL/L (ref 98–107)
CHLORIDE SERPL-SCNC: 105 MMOL/L (ref 98–107)
CO2 SERPL-SCNC: 20 MMOL/L (ref 20–31)
CO2 SERPL-SCNC: 20 MMOL/L (ref 20–31)
CORTISOL COLLECTION INFO: NORMAL
CORTISOL: 5.9 UG/DL (ref 2.7–18.4)
CREAT SERPL-MCNC: 1.46 MG/DL (ref 0.7–1.2)
CREAT SERPL-MCNC: 1.48 MG/DL (ref 0.7–1.2)
GFR SERPL CREATININE-BSD FRML MDRD: 48 ML/MIN/1.73M2
GFR SERPL CREATININE-BSD FRML MDRD: 49 ML/MIN/1.73M2
GLUCOSE SERPL-MCNC: 122 MG/DL (ref 70–99)
GLUCOSE SERPL-MCNC: 136 MG/DL (ref 70–99)
POTASSIUM SERPL-SCNC: 5.1 MMOL/L (ref 3.7–5.3)
POTASSIUM SERPL-SCNC: 5.2 MMOL/L (ref 3.7–5.3)
SODIUM SERPL-SCNC: 134 MMOL/L (ref 135–144)
SODIUM SERPL-SCNC: 135 MMOL/L (ref 135–144)

## 2023-02-04 PROCEDURE — 84244 ASSAY OF RENIN: CPT

## 2023-02-04 PROCEDURE — 6360000002 HC RX W HCPCS: Performed by: CLINICAL NURSE SPECIALIST

## 2023-02-04 PROCEDURE — 82088 ASSAY OF ALDOSTERONE: CPT

## 2023-02-04 PROCEDURE — 99232 SBSQ HOSP IP/OBS MODERATE 35: CPT | Performed by: HOSPITALIST

## 2023-02-04 PROCEDURE — 94760 N-INVAS EAR/PLS OXIMETRY 1: CPT

## 2023-02-04 PROCEDURE — 94664 DEMO&/EVAL PT USE INHALER: CPT

## 2023-02-04 PROCEDURE — 6370000000 HC RX 637 (ALT 250 FOR IP): Performed by: CLINICAL NURSE SPECIALIST

## 2023-02-04 PROCEDURE — 2580000003 HC RX 258: Performed by: HOSPITALIST

## 2023-02-04 PROCEDURE — 36415 COLL VENOUS BLD VENIPUNCTURE: CPT

## 2023-02-04 PROCEDURE — 80048 BASIC METABOLIC PNL TOTAL CA: CPT

## 2023-02-04 PROCEDURE — G0378 HOSPITAL OBSERVATION PER HR: HCPCS

## 2023-02-04 PROCEDURE — 96361 HYDRATE IV INFUSION ADD-ON: CPT

## 2023-02-04 PROCEDURE — 6370000000 HC RX 637 (ALT 250 FOR IP): Performed by: HOSPITALIST

## 2023-02-04 RX ORDER — FUROSEMIDE 20 MG/1
20 TABLET ORAL DAILY
Qty: 60 TABLET | Refills: 3 | Status: SHIPPED | OUTPATIENT
Start: 2023-02-04

## 2023-02-04 RX ORDER — SODIUM BICARBONATE 650 MG/1
650 TABLET ORAL 2 TIMES DAILY
Status: DISCONTINUED | OUTPATIENT
Start: 2023-02-04 | End: 2023-02-04 | Stop reason: HOSPADM

## 2023-02-04 RX ORDER — HYDRALAZINE HYDROCHLORIDE 25 MG/1
25 TABLET, FILM COATED ORAL EVERY 8 HOURS SCHEDULED
Qty: 90 TABLET | Refills: 3 | Status: SHIPPED | OUTPATIENT
Start: 2023-02-04

## 2023-02-04 RX ORDER — FUROSEMIDE 20 MG/1
20 TABLET ORAL DAILY
Status: DISCONTINUED | OUTPATIENT
Start: 2023-02-04 | End: 2023-02-04 | Stop reason: HOSPADM

## 2023-02-04 RX ORDER — SODIUM BICARBONATE 650 MG/1
650 TABLET ORAL 2 TIMES DAILY
Qty: 60 TABLET | Refills: 1 | Status: SHIPPED | OUTPATIENT
Start: 2023-02-04 | End: 2023-03-06

## 2023-02-04 RX ADMIN — SODIUM BICARBONATE 650 MG: 650 TABLET ORAL at 14:16

## 2023-02-04 RX ADMIN — CARVEDILOL 25 MG: 12.5 TABLET, FILM COATED ORAL at 09:05

## 2023-02-04 RX ADMIN — SODIUM CHLORIDE: 9 INJECTION, SOLUTION INTRAVENOUS at 09:27

## 2023-02-04 RX ADMIN — HYDRALAZINE HYDROCHLORIDE 25 MG: 25 TABLET, FILM COATED ORAL at 06:06

## 2023-02-04 RX ADMIN — SODIUM ZIRCONIUM CYCLOSILICATE 10 G: 10 POWDER, FOR SUSPENSION ORAL at 09:05

## 2023-02-04 RX ADMIN — HEPARIN SODIUM 5000 UNITS: 5000 INJECTION INTRAVENOUS; SUBCUTANEOUS at 14:16

## 2023-02-04 RX ADMIN — FUROSEMIDE 20 MG: 20 TABLET ORAL at 14:16

## 2023-02-04 RX ADMIN — ALOGLIPTIN 12.5 MG: 12.5 TABLET, FILM COATED ORAL at 09:06

## 2023-02-04 RX ADMIN — OXYBUTYNIN CHLORIDE 5 MG: 5 TABLET ORAL at 09:13

## 2023-02-04 RX ADMIN — ASPIRIN 81 MG CHEWABLE TABLET 81 MG: 81 TABLET CHEWABLE at 09:05

## 2023-02-04 RX ADMIN — TAMSULOSIN HYDROCHLORIDE 0.4 MG: 0.4 CAPSULE ORAL at 09:05

## 2023-02-04 RX ADMIN — HEPARIN SODIUM 5000 UNITS: 5000 INJECTION INTRAVENOUS; SUBCUTANEOUS at 06:06

## 2023-02-04 RX ADMIN — NIFEDIPINE 90 MG: 30 TABLET, FILM COATED, EXTENDED RELEASE ORAL at 09:05

## 2023-02-04 RX ADMIN — SERTRALINE HYDROCHLORIDE 50 MG: 50 TABLET ORAL at 09:05

## 2023-02-04 RX ADMIN — SODIUM ZIRCONIUM CYCLOSILICATE 10 G: 10 POWDER, FOR SUSPENSION ORAL at 14:15

## 2023-02-04 RX ADMIN — HYDRALAZINE HYDROCHLORIDE 25 MG: 25 TABLET, FILM COATED ORAL at 14:16

## 2023-02-04 RX ADMIN — CARVEDILOL 25 MG: 12.5 TABLET, FILM COATED ORAL at 17:54

## 2023-02-04 NOTE — DISCHARGE INSTR - DIET
Good nutrition is important when healing from an illness, injury, or surgery. Follow any nutrition recommendations given to you during your hospital stay. If you were given an oral nutrition supplement while in the hospital, continue to take this supplement at home. You can take it with meals, in-between meals, and/or before bedtime. These supplements can be purchased at most local grocery stores, pharmacies, and chain [employer]-stores. If you have any questions about your diet or nutrition, call the hospital and ask for the dietitian.     No added salt and Low Potassium

## 2023-02-04 NOTE — DISCHARGE SUMMARY
[facility]  Office: [address], DO, Nolberto Bradley, DO, Yanick Waite, DO, Primitivo Barth Blood, DO, Alicia Alcazar MD, Lupe Bennett MD, Lilibeth Husain MD, Darylene Sparks, MD,  Ailyn Reese MD, All Castellon MD, Fuad Rucker, DO, Greg Alejandre MD,  Shai Robles MD, Ismael Samuel MD, Paige Carreon DO, Melaine Whitley MD, Yunior Devries MD, Homero Palacio, DO, Stark Mcardle, MD, Matt Mensah MD, Meron Alaniz MD, Hillis Kocher, MD, Leana Villa, DO, Tri Matamoros MD, Nii Workman MD, Rolly Waldron, CNP,  Nadeem Schilling, CNP, Giorgi Clemens, CNP, Christian Montes De Oca, CNP,  [address], [facility], Ivone Britt, CNP, Ilya Lopez, CNP, Candee Dakin, CNP, Dulce Alegria, CNP, Holly Sheldon, CNP, Aida Roque PA-C, Bob Faith, CNS, Michael Mendenhall, CNP, Rai Boyd, CNP         [facility]    Discharge Summary     Patient ID: Alexis Burnham  :  1945   MRN: [id number]     ACCOUNT:  [de-identified]   Patient's PCP: None Provider  Admit Date: 2023   Discharge Date: 2023     Length of Stay: 2  Code Status:  Full Code  Admitting Physician: Ren Bojorquez DO  Discharge Physician: Ren Bojorquez DO     Active Discharge Diagnoses:     Hospital Problem Lists:  Principal Problem:    Hyperkalemia  Resolved Problems:    * No resolved hospital problems. *      Admission Condition:  fair     Discharged Condition: good    Hospital Stay:     Hospital Course:  Alexis Burnham is a 68 y.o. male who was admitted for the management of  Hyperkalemia , presented to ER with Abnormal Lab    This very pleasant 75-year-old male presented to the hospital with hyperkalemia. He has been undergoing work-up for hyperkalemia and it was noted that he was elevated on outpatient laboratory testing and instructed to come to the hospital.  The patient was admitted and given insulin, dextrose, Lasix.   He underwent fluid resuscitation. 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 The patient underwent renin, aldosterone, cortisol, thyroid testing. Many of these tests will need to be followed up as an outpatient and he is to follow-up with nephrology for results of these test.  Patient was started on Lasix and sodium bicarbonate discharged home with instructions to follow-up with nephrology for results. Patient is discharged in stable condition. Significant therapeutic interventions: As above    Significant Diagnostic Studies:   Labs / Micro:  CBC:   Lab Results   Component Value Date/Time    WBC 5.4 02/02/2023 06:02 PM    RBC 4.24 02/02/2023 06:02 PM    HGB 12.4 02/02/2023 06:02 PM    HCT 37.6 02/02/2023 06:02 PM    MCV 88.7 02/02/2023 06:02 PM    MCH 29.3 02/02/2023 06:02 PM    MCHC 33.1 02/02/2023 06:02 PM    RDW 14.3 02/02/2023 06:02 PM     02/02/2023 06:02 PM     BMP:    Lab Results   Component Value Date/Time    GLUCOSE 122 02/04/2023 10:52 AM     02/04/2023 10:52 AM    K 5.1 02/04/2023 10:52 AM     02/04/2023 10:52 AM    CO2 20 02/04/2023 10:52 AM    ANIONGAP 10 02/04/2023 10:52 AM    BUN 31 02/04/2023 10:52 AM    CREATININE 1.48 02/04/2023 10:52 AM    CALCIUM 8.3 02/04/2023 10:52 AM    LABGLOM 48 02/04/2023 10:52 AM        Radiology:  XR CHEST PORTABLE    Result Date: 2/2/2023  No acute process. Bibasilar hypoaeration       Consultations:    Consults:     Final Specialist Recommendations/Findings:   None      The patient was seen and examined on day of discharge and this discharge summary is in conjunction with any daily progress note from day of discharge.     Discharge plan:     Disposition: Home    Physician Follow Up:     Blossom Chopra MD  [place] [address], [place] [address]  [address]  [phone]    Follow up  Please tell the office that Dr. Gwen Giudry spoke with Dr. Axel Hair and that they should try and get him in within the next 2 weeks       Requiring Further Evaluation/Follow Up POST HOSPITALIZATION/Incidental Findings: Follow-up with nephrology for laboratory results    Diet: regular diet    Activity: As tolerated    Instructions to Patient: Repeat BMP on Tuesday    Discharge Medications:      Medication List        START taking these medications      furosemide 20 MG tablet  Commonly known as: LASIX  Take 1 tablet by mouth daily     hydrALAZINE 25 MG tablet  Commonly known as: APRESOLINE  Take 1 tablet by mouth every 8 hours     sodium bicarbonate 650 MG tablet  Take 1 tablet by mouth 2 times daily            CONTINUE taking these medications      alogliptin 12.5 MG Tabs tablet  Commonly known as: NESINA     aspirin 81 MG chewable tablet     carvedilol 25 MG tablet  Commonly known as: COREG     finasteride 5 MG tablet  Commonly known as: PROSCAR     metFORMIN 1000 MG tablet  Commonly known as: GLUCOPHAGE     NIFEdipine 90 MG extended release tablet  Commonly known as: ADALAT CC     oxybutynin 5 MG tablet  Commonly known as: DITROPAN     sertraline 50 MG tablet  Commonly known as: ZOLOFT     tamsulosin 0.4 MG capsule  Commonly known as: FLOMAX               Where to Get Your Medications        These medications were sent to [facility] [address], [place] - P [phone] - F [phone]  [address] [address]      Phone: [phone]   furosemide 20 MG tablet  hydrALAZINE 25 MG tablet  sodium bicarbonate 650 MG tablet         No discharge procedures on file. Time Spent on discharge is  20 mins in patient examination, evaluation, counseling as well as medication reconciliation, prescriptions for required medications, discharge plan and follow up. Electronically signed by   Otilia Gomez DO  2/4/2023  12:46 PM      Thank you Dr. Placido Noel Provider for the opportunity to be involved in this patient's care.

## 2023-02-04 NOTE — PROGRESS NOTES
[facility]  Office: [address], DO, Pamela Maurisio, DO, Jefferson Medina, DO, Morgan Dong Blood, DO, Lila Pearson MD, Mia Butterfield MD, Logan Nichole MD, Catherine Andrade MD,  Aubrie Calvin MD, Maria Del Carmen Pulliam MD, Estrella Choudhary, DO, Tristen Talavera MD,  Keeley Noble MD, [place] MD, Jonny Lyon, DO, Angi Shelley MD, Kat Mustafa MD, Truong Diaz, DO, Ron Cunha MD, Lucie Pineda MD, Shavon Marin MD, Janet Mack MD, Wicho Cardona DO, Maverick Cowan MD, Sean Lee MD, Arnav Rogers, CNP,  Cortney Bradshaw, CNP, April Oconnell, CNP, Flex Guidry, CNP,  Alexey Moreno, [facility], Herbert Sabillon, CNP, Ingrid Gamez, CNP, Benito Álvarez, CNP, Silvio Peña, CNP, Antony Libman, CNP, Maria R Aguila PAKhadijahC, Karen Appiah, CNS, Ziyad Doherty, CNP, Mihir Covarrubias, CNP         [address] [facility]    Progress Note    2/4/2023    12:42 PM    Name:   Jovan Smart  MRN:     [id number]     [place]:      [de-identified]   Room:   [address] [place] [address] Day:  2  Admit Date:  2/2/2023  5:40 PM    PCP:   None Provider  Code Status:  Full Code    Subjective:     C/C:   Chief Complaint   Patient presents with    Abnormal Lab     Patient seen in follow-up for persistent hyperkalemia, patient states \"I feel fine\"    Interval History Status: improved. Patient clinically has RTA 4. Work-up is underway and following 24-hour urine collection I have no objection to discharge home. Discussed with nephrology, patient will follow-up as an outpatient for renin and aldosterone results. Meanwhile he will be started on Lasix and sodium bicarbonate. Patient's son was presented to the bedside and multiple questions answered. At this point in time neither patient nor his sons have any questions or concerns. He will be discharged home later today with outpatient follow-up with nephrology. Brief History:      This very pleasant 26-year-old male presented to hospital with abnormal labs. He was found to have an elevated potassium. The patient has been admitted and treatment initiated. 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 The patient is being initiated on Lasix and sodium bicarbonate and following 24-hour urine collection he will be discharged home with follow-up with nephrology. Review of Systems:     Constitutional:  negative for chills, fevers, sweats  Respiratory:  negative for cough, dyspnea on exertion, shortness of breath, wheezing  Cardiovascular:  negative for chest pain, chest pressure/discomfort, lower extremity edema, palpitations  Gastrointestinal:  negative for abdominal pain, constipation, diarrhea, nausea, vomiting  Neurological:  negative for dizziness, headache    Medications: Allergies:  No Known Allergies    Current Meds:   Scheduled Meds:    furosemide  20 mg Oral Daily    sodium bicarbonate  650 mg Oral BID    hydrALAZINE  25 mg Oral 3 times per day    sodium zirconium cyclosilicate  10 g Oral TID    alogliptin  12.5 mg Oral Daily    aspirin  81 mg Oral Daily    carvedilol  25 mg Oral BID WC    [Held by provider] metFORMIN  500 mg Oral BID    NIFEdipine  90 mg Oral Daily    oxybutynin  5 mg Oral BID    sertraline  50 mg Oral Daily    tamsulosin  0.4 mg Oral Daily    sodium chloride flush  5-40 mL IntraVENous 2 times per day    heparin (porcine)  5,000 Units SubCUTAneous 3 times per day     Continuous Infusions:    sodium chloride 100 mL/hr at 02/04/23 2484    sodium chloride       PRN Meds: sodium chloride flush, sodium chloride, ondansetron **OR** ondansetron, acetaminophen **OR** acetaminophen    Data:     Past Medical History:   has a past medical history of CAD (coronary artery disease), Diabetes mellitus ([facility] [address] Heart attack ([facility] [address] Hypertension, and Leaky heart valve. Social History:   reports that he has been smoking cigars. He has never used smokeless tobacco. He reports current alcohol use.  He reports that he does not use drugs. Family History: History reviewed. No pertinent family history. Vitals:  BP (!) 173/59   Pulse 78   Temp 98.6 °F (37 °C) (Oral)   Resp 18   Ht 5' 11\" (1.803 m)   Wt 214 lb 11.7 oz (97.4 kg)   SpO2 90%   BMI 29.95 kg/m²   Temp (24hrs), Av.1 °F (36.7 °C), Min:97.7 °F (36.5 °C), Max:98.6 °F (37 °C)    Recent Labs     23   POCGLU 108       I/O (24Hr): Intake/Output Summary (Last 24 hours) at 2023 1242  Last data filed at 2023 3355  Gross per 24 hour   Intake 2249.04 ml   Output 915 ml   Net 1334.04 ml       Labs:  Hematology:  Recent Labs     238   WBC 5.4  --    RBC 4.24*  --    HGB 12.4*  --    HCT 37.6*  --    MCV 88.7  --    MCH 29.3  --    MCHC 33.1  --    RDW 14.3  --      --    MPV 7.8  --    INR  --  1.0     Chemistry:  Recent Labs     236 23  0458 23  1414 23  0902 23  1052     --    < > 136 134* 135 134*   K 6.4*  --    < > 5.1 5.2 5.2 5.1     --    < > 107 105 105 104   CO2 21  --    < > 20 20 20 20   GLUCOSE 157*  --    < > 96 100* 136* 122*   BUN 39*  --    < > 35* 32* 30* 31*   CREATININE 1.79*  --    < > 1.57* 1.48* 1.46* 1.48*   MG  --   --   --  1.9  --   --   --    ANIONGAP 11  --    < > 9 9 10 10   LABGLOM 39*  --    < > 45* 48* 49* 48*   CALCIUM 9.1  --    < > 8.3* 7.9* 8.3* 8.3*   TROPHS 45* 38*  --   --   --   --   --     < > = values in this interval not displayed.      Recent Labs     23  1802 238 238 23  1414   PROT 8.3  --  6.8  --    LABALBU 4.3  --  3.5  --    TSH  --   --   --  4.24   AST 18  --  14  --    ALT 11  --  7  --    ALKPHOS 53  --  44  --    BILITOT 0.2*  --  0.2*  --    POCGLU  --  108  --   --      ABG:No results found for: POCPH, PHART, PH, POCPCO2, UFG9JVX, PCO2, POCPO2, PO2ART, PO2, POCHCO3, YRK4OJZ, HCO3, NBEA, PBEA, BEART, BE, THGBART, THB, XVZ4FKP, BWST5HRJ, J2ZXKFTD, O2SAT, FIO2  No results found for: SPECIAL  No results found for: CULTURE    Radiology:  XR CHEST PORTABLE    Result Date: 2/2/2023  No acute process. Bibasilar hypoaeration       Physical Examination:       General appearance:  alert, cooperative and no distress  Mental Status:  oriented to person, place and time and normal affect  Lungs:  clear to auscultation bilaterally, normal effort  Heart:  regular rate and rhythm, no murmur  Abdomen:  soft, nontender, nondistended, normal bowel sounds, no masses, hepatomegaly, splenomegaly  Extremities:  no edema, redness, tenderness in the calves  Skin:  no gross lesions, rashes, induration    Assessment:     Hospital Problems             Last Modified POA    * (Principal) Hyperkalemia 2/2/2023 Yes       Plan:     Hyperkalemia  Initiate Lasix/sodium bicarbonate  Follow-up with nephrology as an outpatient  Acute kidney injury  Improved, patient undoubtedly has an element of underlying renal dysfunction. Suspect stage IIIa chronic kidney disease.   Essential hypertension  Continue Procardia and Coreg  Continue hydralazine on discharge  Diabetes mellitus  Okay to resume metformin, continue [address]    Discharge home following 24-hour urine collection    Alan Zee DO  2/4/2023  12:42 PM

## 2023-02-04 NOTE — PLAN OF CARE
Problem: Discharge Planning  Goal: Discharge to home or other facility with appropriate resources  Outcome: Progressing     Problem: Safety - Adult  Goal: Free from fall injury  Outcome: Progressing   The patient remained free from falls this shift, call light within reach, bed in locked and lowest position. Side rails up x2. Continue to monitor closely.

## 2023-02-05 NOTE — PROGRESS NOTES
Discharge instructions and medications reviewed with patient. No questions at this time. IV removed without complication. Patient discharged with his belongings via wheelchair.

## 2023-02-07 LAB — ALDOSTERONE: <3 NG/DL

## 2023-02-08 LAB — RENIN ACTIVITY: 0.5 NG/ML/HR
